# Patient Record
(demographics unavailable — no encounter records)

---

## 2025-01-10 NOTE — HISTORY OF PRESENT ILLNESS
[FreeTextEntry1] : rpa: blackheads [de-identified] : 50M last seen 2024, presenting today for evaluation of the followin. F/u hidrocystomas of eyelids S/p I&D of inflamed lesions at last two prior visits. Referred to ocular plastic surgeon at  but has not gone yet because of family stressors.  Referred to ophthalmology at outside practice at prior visit but was informed that because lesions do not affect vision, he should be referred to plastics.   No personal or family history of skin cancer.

## 2025-01-10 NOTE — ASSESSMENT
[FreeTextEntry1] : 1. Hidrocystomas of eyelids, chronic, asymptomatic  - Diagnosis reviewed.  - Patient with history of multiple I&Ds and recurrence.  - Reports that he saw an outside ophthalmologist and was referred to plastic surgeon. Patient amenable to seeing ocular plastic surgeon today. Referral placed with [facility] at last visit and patient has referral.  - Risks/benefits/alternatives of I&D discussed. Patient requests I&D today. - Incision and drainage using 11 blade and qtips.   RTC PRN.

## 2025-01-10 NOTE — PHYSICAL EXAM
[FreeTextEntry3] : Focused skin exam performed  The relevant portions of the exam were performed today  AAOx3, NAD, well-appearing / pleasant Focused examination within normal limits with the exception of: Many shiny hyperpigmented and erythematous nodules and papules on the b/l upper and lower eyelids

## 2025-03-24 NOTE — HEALTH RISK ASSESSMENT
[Excellent] : ~his/her~  mood as  excellent [Yes] : Yes [Monthly or less (1 pt)] : Monthly or less (1 point) [1 or 2 (0 pts)] : 1 or 2 (0 points) [Never (0 pts)] : Never (0 points) [No] : In the past 12 months have you used drugs other than those required for medical reasons? No [No falls in past year] : Patient reported no falls in the past year [0] : 2) Feeling down, depressed, or hopeless: Not at all (0) [PHQ-2 Negative - No further assessment needed] : PHQ-2 Negative - No further assessment needed [Never] : Never [Employed] : employed [Feels Safe at Home] : Feels safe at home [Fully functional (bathing, dressing, toileting, transferring, walking, feeding)] : Fully functional (bathing, dressing, toileting, transferring, walking, feeding) [Fully functional (using the telephone, shopping, preparing meals, housekeeping, doing laundry, using] : Fully functional and needs no help or supervision to perform IADLs (using the telephone, shopping, preparing meals, housekeeping, doing laundry, using transportation, managing medications and managing finances) [Reports normal functional visual acuity (ie: able to read med bottle)] : Reports normal functional visual acuity [Smoke Detector] : smoke detector [Seat Belt] :  uses seat belt [Audit-CScore] : 1 [[id number]] : 0 [Change in mental status noted] : No change in mental status noted [Language] : denies difficulty with language [Behavior] : denies difficulty with behavior [Learning/Retaining New Information] : denies difficulty learning/retaining new information [Handling Complex Tasks] : denies difficulty handling complex tasks [Reasoning] : denies difficulty with reasoning [Spatial Ability and Orientation] : denies difficulty with spatial ability and orientation [Reports changes in hearing] : Reports no changes in hearing [Reports changes in vision] : Reports no changes in vision [Reports changes in dental health] : Reports no changes in dental health

## 2025-03-24 NOTE — HEALTH RISK ASSESSMENT
[Excellent] : ~his/her~  mood as  excellent [Yes] : Yes [Monthly or less (1 pt)] : Monthly or less (1 point) [1 or 2 (0 pts)] : 1 or 2 (0 points) [Never (0 pts)] : Never (0 points) [No] : In the past 12 months have you used drugs other than those required for medical reasons? No [No falls in past year] : Patient reported no falls in the past year [0] : 2) Feeling down, depressed, or hopeless: Not at all (0) [PHQ-2 Negative - No further assessment needed] : PHQ-2 Negative - No further assessment needed [Never] : Never [Employed] : employed [Feels Safe at Home] : Feels safe at home [Fully functional (bathing, dressing, toileting, transferring, walking, feeding)] : Fully functional (bathing, dressing, toileting, transferring, walking, feeding) [Fully functional (using the telephone, shopping, preparing meals, housekeeping, doing laundry, using] : Fully functional and needs no help or supervision to perform IADLs (using the telephone, shopping, preparing meals, housekeeping, doing laundry, using transportation, managing medications and managing finances) [Reports normal functional visual acuity (ie: able to read med bottle)] : Reports normal functional visual acuity [Smoke Detector] : smoke detector [Seat Belt] :  uses seat belt [Audit-CScore] : 1 [[id number]] : 0 [Change in mental status noted] : No change in mental status noted [Language] : denies difficulty with language [Behavior] : denies difficulty with behavior [Learning/Retaining New Information] : denies difficulty learning/retaining new information [Handling Complex Tasks] : denies difficulty handling complex tasks [Reasoning] : denies difficulty with reasoning [Spatial Ability and Orientation] : denies difficulty with spatial ability and orientation [Reports changes in hearing] : Reports no changes in hearing [Reports changes in vision] : Reports no changes in vision [Reports changes in dental health] : Reports no changes in dental health

## 2025-03-24 NOTE — HISTORY OF PRESENT ILLNESS
[FreeTextEntry1] : Mr. RAYA is here for an annual physical examination and assessment. [de-identified] : He generally feels well with no specific complaints. His recent health has been good. Under a lot of stress due to a prolonged hospitalization of his father.  Has had good weight loss with Mounjaro, down to 336 pounds.  NO gout.  Needs a testosterone injection.   Denies headache, dizziness. Denies rash, fatigue, fever, weight loss, anorexia. Denies cough, wheezing. Denies CP, SOB, GREY, edema, palpitations. Denies abdominal pain, N/V/D/C. Denies BRBPR, melena, dysphagia. Denies dysuria, frequency, hematuria, hesitancy. Denies weakness, numbness, gait instability.

## 2025-03-24 NOTE — ASSESSMENT
[FreeTextEntry1] : Recent lab results reviewed.  Under sterile technique 1/2 cc of testosterone cypionate 200 mg /cc injected with 22 g needle into right   buttock. No complications.

## 2025-03-24 NOTE — HISTORY OF PRESENT ILLNESS
[FreeTextEntry1] : Mr. RAYA is here for an annual physical examination and assessment. [de-identified] : He generally feels well with no specific complaints. His recent health has been good. Under a lot of stress due to a prolonged hospitalization of his father.  Has had good weight loss with Mounjaro, down to 336 pounds.  NO gout.  Needs a testosterone injection.   Denies headache, dizziness. Denies rash, fatigue, fever, weight loss, anorexia. Denies cough, wheezing. Denies CP, SOB, GREY, edema, palpitations. Denies abdominal pain, N/V/D/C. Denies BRBPR, melena, dysphagia. Denies dysuria, frequency, hematuria, hesitancy. Denies weakness, numbness, gait instability.

## 2025-06-12 NOTE — HISTORY OF PRESENT ILLNESS
[FreeTextEntry1] : rpa: blackheads [de-identified] : 50M last seen 2025 presenting today for evaluation of the followin. Rash on the chest and arms x years. Worsening. Itchy.  No personal or family history of skin cancer.

## 2025-06-12 NOTE — ASSESSMENT
[FreeTextEntry1] : #Tinea versicolor, abdomen, arms, flaring, chronic - Education and counseling; patient handout provided - Advised patient of benign nature; rash due to superficial fungal infection that can cause lesions of varying pigmentation. However, likely will recur due to chronic nature and will need maintenance to prevention - Skin color changes (postinflammatory hyper- or hypo-pigmentation) after rash resolves may take longer to resolve - Start ketoconazole 2% shampoo as body wash to affected areas daily for 4 weeks or until lesions resolve. Apply for at least 5 minutes daily in shower and wash off. Can continue once weekly for prevention of recurrence after resolution.  - Start ketoconazole 2% cream daily to affected areas x 2-3 weeks. Proper medication use and side effects discussed - Gentle skin care reviewed- dove for sensitive skin soap/fragrance free products and moisturizer - if not clear at next visit, can consider oral fluconazole is indicated and no contraindication  RTC 6 wks

## 2025-06-12 NOTE — PHYSICAL EXAM
[FreeTextEntry3] : Focused skin exam performed  The relevant portions of the exam were performed today  AAOx3, NAD, well-appearing / pleasant Extensive tan plaques, papules, papules, on the chest and abdomen, arms (see photos)

## 2025-06-12 NOTE — HISTORY OF PRESENT ILLNESS
[FreeTextEntry1] : rpa: blackheads [de-identified] : 50M last seen 2025 presenting today for evaluation of the followin. Rash on the chest and arms x years. Worsening. Itchy.  No personal or family history of skin cancer.